# Patient Record
Sex: FEMALE | Race: WHITE | URBAN - METROPOLITAN AREA
[De-identification: names, ages, dates, MRNs, and addresses within clinical notes are randomized per-mention and may not be internally consistent; named-entity substitution may affect disease eponyms.]

---

## 2020-11-13 ENCOUNTER — NURSE ONLY (OUTPATIENT)
Dept: FAMILY MEDICINE CLINIC | Facility: CLINIC | Age: 25
End: 2020-11-13
Payer: MEDICAID

## 2020-11-13 PROCEDURE — 90472 IMMUNIZATION ADMIN EACH ADD: CPT | Performed by: FAMILY MEDICINE

## 2020-11-13 PROCEDURE — 90686 IIV4 VACC NO PRSV 0.5 ML IM: CPT | Performed by: FAMILY MEDICINE

## 2020-11-13 PROCEDURE — 90471 IMMUNIZATION ADMIN: CPT | Performed by: FAMILY MEDICINE

## 2020-11-13 PROCEDURE — 90716 VAR VACCINE LIVE SUBQ: CPT | Performed by: FAMILY MEDICINE

## 2020-11-13 NOTE — PROGRESS NOTES
Varicella vaccine given in Right arm, Influenza vaccine given in Left giuseppe. Pt tolerated vaccines well.  VIS sheets given

## 2020-11-16 ENCOUNTER — TELEPHONE (OUTPATIENT)
Dept: FAMILY MEDICINE CLINIC | Facility: CLINIC | Age: 25
End: 2020-11-16

## 2021-01-21 ENCOUNTER — OFFICE VISIT (OUTPATIENT)
Dept: FAMILY MEDICINE CLINIC | Facility: CLINIC | Age: 26
End: 2021-01-21
Payer: MEDICAID

## 2021-01-21 ENCOUNTER — TELEPHONE (OUTPATIENT)
Dept: FAMILY MEDICINE CLINIC | Facility: CLINIC | Age: 26
End: 2021-01-21

## 2021-01-21 ENCOUNTER — LAB ENCOUNTER (OUTPATIENT)
Dept: LAB | Age: 26
End: 2021-01-21
Attending: FAMILY MEDICINE
Payer: MEDICAID

## 2021-01-21 VITALS
HEART RATE: 64 BPM | SYSTOLIC BLOOD PRESSURE: 110 MMHG | WEIGHT: 122.63 LBS | HEIGHT: 64.5 IN | BODY MASS INDEX: 20.68 KG/M2 | TEMPERATURE: 98 F | DIASTOLIC BLOOD PRESSURE: 63 MMHG | RESPIRATION RATE: 18 BRPM

## 2021-01-21 DIAGNOSIS — Z11.1 SCREENING FOR TUBERCULOSIS: ICD-10-CM

## 2021-01-21 DIAGNOSIS — Z00.00 ROUTINE PHYSICAL EXAMINATION: ICD-10-CM

## 2021-01-21 DIAGNOSIS — B01.9 VARICELLA WITHOUT COMPLICATION: ICD-10-CM

## 2021-01-21 DIAGNOSIS — L70.0 ACNE VULGARIS: ICD-10-CM

## 2021-01-21 DIAGNOSIS — Z00.00 ROUTINE HEALTH MAINTENANCE: ICD-10-CM

## 2021-01-21 DIAGNOSIS — Z00.00 ROUTINE HEALTH MAINTENANCE: Primary | ICD-10-CM

## 2021-01-21 PROBLEM — D68.51 FACTOR 5 LEIDEN MUTATION, HETEROZYGOUS (HCC): Status: ACTIVE | Noted: 2021-01-21

## 2021-01-21 LAB
ANION GAP SERPL CALC-SCNC: 4 MMOL/L (ref 0–18)
BUN BLD-MCNC: 8 MG/DL (ref 7–18)
BUN/CREAT SERPL: 12.5 (ref 10–20)
CALCIUM BLD-MCNC: 9.4 MG/DL (ref 8.5–10.1)
CHLORIDE SERPL-SCNC: 108 MMOL/L (ref 98–112)
CHOLEST SMN-MCNC: 120 MG/DL (ref ?–200)
CO2 SERPL-SCNC: 26 MMOL/L (ref 21–32)
CREAT BLD-MCNC: 0.64 MG/DL
GLUCOSE BLD-MCNC: 77 MG/DL (ref 70–99)
HDLC SERPL-MCNC: 48 MG/DL (ref 40–59)
LDLC SERPL CALC-MCNC: 61 MG/DL (ref ?–100)
NONHDLC SERPL-MCNC: 72 MG/DL (ref ?–130)
OSMOLALITY SERPL CALC.SUM OF ELEC: 283 MOSM/KG (ref 275–295)
PATIENT FASTING Y/N/NP: NO
PATIENT FASTING Y/N/NP: NO
POTASSIUM SERPL-SCNC: 4 MMOL/L (ref 3.5–5.1)
SODIUM SERPL-SCNC: 138 MMOL/L (ref 136–145)
TRIGL SERPL-MCNC: 57 MG/DL (ref 30–149)
VLDLC SERPL CALC-MCNC: 11 MG/DL (ref 0–30)

## 2021-01-21 PROCEDURE — 80061 LIPID PANEL: CPT

## 2021-01-21 PROCEDURE — 87491 CHLMYD TRACH DNA AMP PROBE: CPT

## 2021-01-21 PROCEDURE — 3008F BODY MASS INDEX DOCD: CPT | Performed by: FAMILY MEDICINE

## 2021-01-21 PROCEDURE — 86480 TB TEST CELL IMMUN MEASURE: CPT

## 2021-01-21 PROCEDURE — 3074F SYST BP LT 130 MM HG: CPT | Performed by: FAMILY MEDICINE

## 2021-01-21 PROCEDURE — 3078F DIAST BP <80 MM HG: CPT | Performed by: FAMILY MEDICINE

## 2021-01-21 PROCEDURE — 99395 PREV VISIT EST AGE 18-39: CPT | Performed by: FAMILY MEDICINE

## 2021-01-21 PROCEDURE — 87389 HIV-1 AG W/HIV-1&-2 AB AG IA: CPT

## 2021-01-21 PROCEDURE — 36415 COLL VENOUS BLD VENIPUNCTURE: CPT

## 2021-01-21 PROCEDURE — 86787 VARICELLA-ZOSTER ANTIBODY: CPT

## 2021-01-21 PROCEDURE — 80048 BASIC METABOLIC PNL TOTAL CA: CPT

## 2021-01-21 PROCEDURE — 87591 N.GONORRHOEAE DNA AMP PROB: CPT

## 2021-01-21 RX ORDER — SPIRONOLACTONE 50 MG/1
100 TABLET, FILM COATED ORAL
Refills: 0 | Status: CANCELLED | OUTPATIENT
Start: 2021-01-21

## 2021-01-21 RX ORDER — SPIRONOLACTONE 50 MG/1
50 TABLET, FILM COATED ORAL
COMMUNITY
Start: 2019-12-01 | End: 2021-01-21

## 2021-01-21 NOTE — TELEPHONE ENCOUNTER
Go to [facility]  Key:[id number]    Current Outpatient Medications:   •  Tretinoin 0.05 % External Cream, Apply 1 Application topically nightly., Disp: 45 g, Rfl: 2

## 2021-01-21 NOTE — TELEPHONE ENCOUNTER
Prior authorization for Tretion completed w/ [employer] on cover my meds Key: [id number], turn around time 1-5 days.

## 2021-01-21 NOTE — PROGRESS NOTES
HPI:    Patient ID: Cindi Sanders is a 31year old female. HPI    Review of Systems   Constitutional: Negative. Respiratory: Negative. Cardiovascular: Negative. Gastrointestinal: Negative. Skin: Negative. Neurological: Negative. Visit:  Requested Prescriptions     Signed Prescriptions Disp Refills   • Tretinoin 0.05 % External Cream 45 g 2     Sig: Apply 1 Application topically nightly.        Imaging & Referrals:  None       #[id number]

## 2021-01-22 LAB — VZV IGG SER IA-ACNC: 417.4 (ref 165–?)

## 2021-01-22 NOTE — TELEPHONE ENCOUNTER
Prior authorization has been denied for Tretinoin cream. Patients plan states medication is not covered due to not meeting criteria. Medication is not covered for patient's over the age of 22.

## 2021-01-23 ENCOUNTER — PATIENT MESSAGE (OUTPATIENT)
Dept: FAMILY MEDICINE CLINIC | Facility: CLINIC | Age: 26
End: 2021-01-23

## 2021-01-23 LAB
M TB IFN-G CD4+ T-CELLS BLD-ACNC: 0.03 IU/ML
M TB TUBERC IFN-G BLD QL: NEGATIVE
M TB TUBERC IGNF/MITOGEN IGNF CONTROL: >10 IU/ML
QUANTIFERON TB1 MINUS NIL: 0 IU/ML
QUANTIFERON TB2 MINUS NIL: 0.01 IU/ML

## 2021-01-23 RX ORDER — SPIRONOLACTONE 50 MG/1
50 TABLET, FILM COATED ORAL 2 TIMES DAILY
Qty: 30 TABLET | Refills: 3 | Status: SHIPPED | OUTPATIENT
Start: 2021-01-23 | End: 2021-07-06

## 2021-01-23 NOTE — TELEPHONE ENCOUNTER
Spironolactone 50mg was discontinued at [address] on 1/21/21. Please advise on refill.   Trtinoin sent to [facility] per patient request.

## 2021-01-23 NOTE — TELEPHONE ENCOUNTER
From: Denny Hernández  To: Shawn Ortiz. Honorio Santos MD  Sent: 1/23/2021 10:58 AM CST  Subject: Visit Abram Santos and Staff,    Thanks for your assistance. I think I am going to try doing GoodRx for both prescriptions.  I was wondering if

## 2021-01-26 LAB
C TRACH DNA SPEC QL NAA+PROBE: NEGATIVE
N GONORRHOEA DNA SPEC QL NAA+PROBE: NEGATIVE

## 2021-07-06 RX ORDER — SPIRONOLACTONE 50 MG/1
TABLET, FILM COATED ORAL
Qty: 60 TABLET | Refills: 1 | Status: SHIPPED | OUTPATIENT
Start: 2021-07-06 | End: 2021-08-24

## 2021-07-20 ENCOUNTER — TELEPHONE (OUTPATIENT)
Dept: FAMILY MEDICINE CLINIC | Facility: CLINIC | Age: 26
End: 2021-07-20

## 2021-07-20 NOTE — TELEPHONE ENCOUNTER
Disp Refills Start End    SPIRONOLACTONE 50 MG Oral Tab 60 tablet 1 7/6/2021     Sig: TAKE 1 TABLET BY MOUTH TWICE A DAY    Sent to pharmacy as: Spironolactone 50 MG Oral Tablet (ALDACTONE)    E-Prescribing Status: Receipt confirmed by pharmacy (7/6/2021

## 2021-07-20 NOTE — TELEPHONE ENCOUNTER
Current Outpatient Medications:   •  SPIRONOLACTONE 50 MG Oral Tab, TAKE 1 TABLET BY MOUTH TWICE A DAY, Disp: 60 tablet, Rfl: 1

## 2021-07-22 ENCOUNTER — TELEPHONE (OUTPATIENT)
Dept: FAMILY MEDICINE CLINIC | Facility: CLINIC | Age: 26
End: 2021-07-22

## 2021-08-24 RX ORDER — SPIRONOLACTONE 50 MG/1
TABLET, FILM COATED ORAL
Qty: 60 TABLET | Refills: 1 | Status: SHIPPED | OUTPATIENT
Start: 2021-08-24 | End: 2021-09-07

## 2021-09-07 ENCOUNTER — TELEPHONE (OUTPATIENT)
Dept: FAMILY MEDICINE CLINIC | Facility: CLINIC | Age: 26
End: 2021-09-07

## 2021-09-07 RX ORDER — SPIRONOLACTONE 50 MG/1
50 TABLET, FILM COATED ORAL 2 TIMES DAILY
Qty: 180 TABLET | Refills: 1 | Status: SHIPPED | OUTPATIENT
Start: 2021-09-07

## 2021-09-07 NOTE — TELEPHONE ENCOUNTER
[facility] requesting a [facility] day supply, #180, for Spironolactone 50mg Tablet, #60 was sent in on 8/24/21.

## 2022-04-26 RX ORDER — SPIRONOLACTONE 50 MG/1
50 TABLET, FILM COATED ORAL 2 TIMES DAILY
Qty: 180 TABLET | Refills: 0 | Status: SHIPPED | OUTPATIENT
Start: 2022-04-26

## 2022-04-26 NOTE — TELEPHONE ENCOUNTER
Please review. Protocol failed. Last exam was January 2021. Will attempt to contact patient.   Requested Prescriptions   Pending Prescriptions Disp Refills    SPIRONOLACTONE 50 MG Oral Tab [Pharmacy Med Name: SPIRONOLACTONE 50 MG TABLET] 180 tablet 1     Sig: TAKE 1 TABLET BY MOUTH TWICE A DAY        Hypertensive Medications Protocol Failed - 4/26/2022 10:22 AM        Failed - CMP or BMP in past 12 months        Failed - Appointment in past 6 or next 3 months        Passed - GFR Non- > 50     Lab Results   Component Value Date    GFRNAA 124 01/21/2021                       Recent Outpatient Visits              1 year ago Routine health maintenance    [facility]    Office Visit    1 year ago     [facility], [facility], [facility]    Nurse Only

## 2022-07-11 RX ORDER — SPIRONOLACTONE 50 MG/1
50 TABLET, FILM COATED ORAL 2 TIMES DAILY
Qty: 180 TABLET | Refills: 2 | Status: SHIPPED | OUTPATIENT
Start: 2022-07-11

## 2022-10-19 NOTE — TELEPHONE ENCOUNTER
Pharmacy is requesting a refill for Tretinoin 0.05 % External Cream. Please advise     [address], [address]

## 2022-10-19 NOTE — TELEPHONE ENCOUNTER
Please review. No protocol. Requested Prescriptions   Pending Prescriptions Disp Refills    Tretinoin 0.05 % External Cream 45 g 2     Sig: Apply 1 Application topically nightly.         There is no refill protocol information for this order           Recent Outpatient Visits              1 year ago Routine health maintenance    [facility], Gianni Abarca Audley Rome, MD    Office Visit    1 year ago     [facility], [facility], José Miguel Abarca    Nurse Only

## 2022-10-20 RX ORDER — TRETINOIN 0.5 MG/G
1 CREAM TOPICAL NIGHTLY
Qty: 45 G | Refills: 2 | Status: SHIPPED | OUTPATIENT
Start: 2022-10-20

## 2022-10-27 ENCOUNTER — TELEPHONE (OUTPATIENT)
Dept: FAMILY MEDICINE CLINIC | Facility: CLINIC | Age: 27
End: 2022-10-27

## 2023-06-22 NOTE — TELEPHONE ENCOUNTER
Please review. Protocol failed. It appears that you would likely advise this patient to schedule physical and possibly pap. Please advise. Requested Prescriptions   Pending Prescriptions Disp Refills    SPIRONOLACTONE 50 MG Oral Tab [Pharmacy Med Name: Spironolactone 50mg Tablet] 180 tablet 1     Sig: Take 1 tablet by mouth twice daily. Hypertensive Medications Protocol Failed - 6/20/2023  3:18 PM        Failed - In person appointment in the past 12 or next 3 months     Recent Outpatient Visits              2 years ago Routine health maintenance    [facility] [place], Donah Sor, MD    Office Visit    2 years ago     [facility], [facility]    Nurse Only                      Failed - CMP or BMP in past 6 months     No results found for this or any previous visit (from the past 4392 hour(s)).             Failed - In person appointment or virtual visit in the past 6 months     Recent Outpatient Visits              2 years ago Routine health maintenance    [facility], Jassi Carmona MD    Office Visit    2 years ago     [facility], [facility]    Nurse Only                      Failed - EGFRCR or GFRNAA > 50     GFR Evaluation            Passed - Last BP reading less than 140/90     BP Readings from Last 1 Encounters:  01/21/21 : 110/63

## 2023-06-22 NOTE — TELEPHONE ENCOUNTER
Please contact patient to make appointment for routine physical.  Send back to me for refill if needed prior to visit.

## 2023-06-26 ENCOUNTER — PATIENT OUTREACH (OUTPATIENT)
Dept: CASE MANAGEMENT | Age: 28
End: 2023-06-26

## 2023-06-26 RX ORDER — SPIRONOLACTONE 50 MG/1
TABLET, FILM COATED ORAL
Qty: 180 TABLET | Refills: 1 | OUTPATIENT
Start: 2023-06-26

## 2023-06-26 NOTE — TELEPHONE ENCOUNTER
Patient contacted and made aware of Dr. Angelica Croft recommendation. She states it was an automated request, she has moved to [place] and is no longer a patient. Patient verbalized understanding. No further questions or concerns at this time.     Order for PCP removal placed no

## 2023-06-26 NOTE — TELEPHONE ENCOUNTER
2nd attempt my chart message was sent to the patient to schedule an appointment per the below request.

## 2023-06-26 NOTE — TELEPHONE ENCOUNTER
Last seen 2.5 years ago. Please contact patient to see if she is still our patient. If so needs to make appointment for routine physical for further refills.

## 2023-06-26 NOTE — PROCEDURES
The office order for PCP removal request is Approved and finalized on June 26, 2023.     Thanks,  [facility] [employer]

## (undated) NOTE — LETTER
11/18/2020              [address]        [address], [address]         Dear [address],    This letter is to inform you that our office has made several attempts to reach you by phone without success. We were attempting to contact you by phone regarding your symptoms. Please contact our office at the number listed below as soon as you receive this letter to discuss this issue and to make the necessary changes in our system to your contact information. Thank you for your cooperation. Sincerely,    Edilma Rosales. Madison Meneses MD  No primary provider on file.    [phone]